# Patient Record
Sex: MALE | URBAN - METROPOLITAN AREA
[De-identification: names, ages, dates, MRNs, and addresses within clinical notes are randomized per-mention and may not be internally consistent; named-entity substitution may affect disease eponyms.]

---

## 2022-10-26 ENCOUNTER — OFFICE VISIT (OUTPATIENT)
Dept: URBAN - METROPOLITAN AREA CLINIC 91 | Facility: CLINIC | Age: 57
End: 2022-10-26
Payer: COMMERCIAL

## 2022-10-26 DIAGNOSIS — H11.001 PTERYGIUM OF RT EYE: Primary | ICD-10-CM

## 2022-10-26 DIAGNOSIS — H11.002 PTERYGIUM OF LT EYE: ICD-10-CM

## 2022-10-26 PROCEDURE — 99204 OFFICE O/P NEW MOD 45 MIN: CPT | Performed by: OPHTHALMOLOGY

## 2022-10-26 RX ORDER — OFLOXACIN 3 MG/ML
0.3 % SOLUTION/ DROPS OPHTHALMIC
Qty: 5 | Refills: 3 | Status: ACTIVE
Start: 2022-10-26

## 2022-10-26 RX ORDER — PREDNISOLONE ACETATE 10 MG/ML
1 % SUSPENSION/ DROPS OPHTHALMIC
Qty: 5 | Refills: 3 | Status: ACTIVE
Start: 2022-10-26

## 2022-10-26 ASSESSMENT — INTRAOCULAR PRESSURE
OS: 16
OD: 14

## 2022-10-26 NOTE — IMPRESSION/PLAN
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 Plan: For pterygium OU, patient has attempted treatment with artificial tears and topical steroids with little to no relief. Due to its progressive nature, I have recommended excision and removal at this time. All risks and benefits associated with surgery were discussed at length with patient, including recurrence, infection, loss of vision or eye and possible need for further surgery. I discussed option of Mitomycin C treatment and placement of amniotic graft with fibron glue. I explained side effects of [facility] with patient. All questions were answered, and patient wishes to schedule surgery at this time. Pt would like to move forward with pterygium removal OD then OS.

## 2023-12-20 ENCOUNTER — PROCEDURE (OUTPATIENT)
Facility: LOCATION | Age: 58
End: 2023-12-20
Payer: COMMERCIAL